# Patient Record
Sex: FEMALE | Race: WHITE | Employment: OTHER | URBAN - NONMETROPOLITAN AREA
[De-identification: names, ages, dates, MRNs, and addresses within clinical notes are randomized per-mention and may not be internally consistent; named-entity substitution may affect disease eponyms.]

---

## 2022-12-23 ENCOUNTER — APPOINTMENT (OUTPATIENT)
Dept: CT IMAGING | Age: 72
End: 2022-12-23
Payer: MEDICARE

## 2022-12-23 ENCOUNTER — HOSPITAL ENCOUNTER (EMERGENCY)
Age: 72
Discharge: HOME | End: 2022-12-23
Attending: STUDENT IN AN ORGANIZED HEALTH CARE EDUCATION/TRAINING PROGRAM
Payer: MEDICARE

## 2022-12-23 VITALS
HEART RATE: 80 BPM | OXYGEN SATURATION: 99 % | RESPIRATION RATE: 18 BRPM | BODY MASS INDEX: 28.47 KG/M2 | WEIGHT: 145 LBS | TEMPERATURE: 98.3 F | HEIGHT: 60 IN | SYSTOLIC BLOOD PRESSURE: 122 MMHG | DIASTOLIC BLOOD PRESSURE: 70 MMHG

## 2022-12-23 DIAGNOSIS — M54.31 BILATERAL SCIATICA: Primary | ICD-10-CM

## 2022-12-23 DIAGNOSIS — M54.32 BILATERAL SCIATICA: Primary | ICD-10-CM

## 2022-12-23 LAB
A/G RATIO: 1.3 (ref 1.1–2.2)
ALBUMIN SERPL-MCNC: 4 G/DL (ref 3.4–5)
ALP BLD-CCNC: 87 U/L (ref 40–129)
ALT SERPL-CCNC: 7 U/L (ref 10–40)
ANION GAP SERPL CALCULATED.3IONS-SCNC: 14 MMOL/L (ref 3–16)
AST SERPL-CCNC: 18 U/L (ref 15–37)
BACTERIA: ABNORMAL /HPF
BASOPHILS ABSOLUTE: 0.1 K/UL (ref 0–0.2)
BASOPHILS RELATIVE PERCENT: 0.7 %
BILIRUB SERPL-MCNC: 0.4 MG/DL (ref 0–1)
BILIRUBIN URINE: NEGATIVE
BLOOD, URINE: NEGATIVE
BUN BLDV-MCNC: 28 MG/DL (ref 7–20)
CALCIUM SERPL-MCNC: 9.3 MG/DL (ref 8.3–10.6)
CHLORIDE BLD-SCNC: 103 MMOL/L (ref 99–110)
CLARITY: ABNORMAL
CO2: 21 MMOL/L (ref 21–32)
COLOR: YELLOW
CREAT SERPL-MCNC: 0.8 MG/DL (ref 0.6–1.2)
EOSINOPHILS ABSOLUTE: 0.1 K/UL (ref 0–0.6)
EOSINOPHILS RELATIVE PERCENT: 0.3 %
EPITHELIAL CELLS, UA: ABNORMAL /HPF (ref 0–5)
GFR SERPL CREATININE-BSD FRML MDRD: >60 ML/MIN/{1.73_M2}
GLUCOSE BLD-MCNC: 151 MG/DL (ref 70–99)
GLUCOSE URINE: NEGATIVE MG/DL
HCT VFR BLD CALC: 40.6 % (ref 36–48)
HEMOGLOBIN: 13.8 G/DL (ref 12–16)
KETONES, URINE: NEGATIVE MG/DL
LEUKOCYTE ESTERASE, URINE: ABNORMAL
LYMPHOCYTES ABSOLUTE: 1.2 K/UL (ref 1–5.1)
LYMPHOCYTES RELATIVE PERCENT: 5.9 %
MCH RBC QN AUTO: 31.3 PG (ref 26–34)
MCHC RBC AUTO-ENTMCNC: 33.9 G/DL (ref 31–36)
MCV RBC AUTO: 92.2 FL (ref 80–100)
MICROSCOPIC EXAMINATION: YES
MONOCYTES ABSOLUTE: 1.1 K/UL (ref 0–1.3)
MONOCYTES RELATIVE PERCENT: 5.3 %
MUCUS: ABNORMAL /LPF
NEUTROPHILS ABSOLUTE: 17.7 K/UL (ref 1.7–7.7)
NEUTROPHILS RELATIVE PERCENT: 87.8 %
NITRITE, URINE: NEGATIVE
PDW BLD-RTO: 12.7 % (ref 12.4–15.4)
PH UA: 5.5 (ref 5–8)
PLATELET # BLD: 217 K/UL (ref 135–450)
PMV BLD AUTO: 7.1 FL (ref 5–10.5)
POTASSIUM REFLEX MAGNESIUM: 4.5 MMOL/L (ref 3.5–5.1)
PROTEIN UA: NEGATIVE MG/DL
RBC # BLD: 4.41 M/UL (ref 4–5.2)
RBC UA: ABNORMAL /HPF (ref 0–4)
SODIUM BLD-SCNC: 138 MMOL/L (ref 136–145)
SPECIFIC GRAVITY UA: 1.01 (ref 1–1.03)
TOTAL PROTEIN: 7 G/DL (ref 6.4–8.2)
URINE REFLEX TO CULTURE: ABNORMAL
URINE TYPE: ABNORMAL
UROBILINOGEN, URINE: 0.2 E.U./DL
WBC # BLD: 20.2 K/UL (ref 4–11)
WBC UA: ABNORMAL /HPF (ref 0–5)

## 2022-12-23 PROCEDURE — 99285 EMERGENCY DEPT VISIT HI MDM: CPT

## 2022-12-23 PROCEDURE — 74177 CT ABD & PELVIS W/CONTRAST: CPT

## 2022-12-23 PROCEDURE — 81001 URINALYSIS AUTO W/SCOPE: CPT

## 2022-12-23 PROCEDURE — 80053 COMPREHEN METABOLIC PANEL: CPT

## 2022-12-23 PROCEDURE — 96374 THER/PROPH/DIAG INJ IV PUSH: CPT

## 2022-12-23 PROCEDURE — 72131 CT LUMBAR SPINE W/O DYE: CPT

## 2022-12-23 PROCEDURE — 6360000004 HC RX CONTRAST MEDICATION: Performed by: STUDENT IN AN ORGANIZED HEALTH CARE EDUCATION/TRAINING PROGRAM

## 2022-12-23 PROCEDURE — 36415 COLL VENOUS BLD VENIPUNCTURE: CPT

## 2022-12-23 PROCEDURE — 6360000002 HC RX W HCPCS: Performed by: STUDENT IN AN ORGANIZED HEALTH CARE EDUCATION/TRAINING PROGRAM

## 2022-12-23 PROCEDURE — 85025 COMPLETE CBC W/AUTO DIFF WBC: CPT

## 2022-12-23 PROCEDURE — 96375 TX/PRO/DX INJ NEW DRUG ADDON: CPT

## 2022-12-23 RX ORDER — IBUPROFEN 200 MG
200 TABLET ORAL EVERY 6 HOURS PRN
COMMUNITY

## 2022-12-23 RX ORDER — TIZANIDINE 2 MG/1
2 TABLET ORAL EVERY 8 HOURS PRN
Qty: 30 TABLET | Refills: 0 | Status: SHIPPED | OUTPATIENT
Start: 2022-12-23

## 2022-12-23 RX ORDER — HYDROCODONE BITARTRATE AND ACETAMINOPHEN 5; 325 MG/1; MG/1
1 TABLET ORAL EVERY 6 HOURS PRN
Qty: 12 TABLET | Refills: 0 | Status: SHIPPED | OUTPATIENT
Start: 2022-12-23 | End: 2022-12-26

## 2022-12-23 RX ORDER — KETOROLAC TROMETHAMINE 30 MG/ML
15 INJECTION, SOLUTION INTRAMUSCULAR; INTRAVENOUS ONCE
Status: COMPLETED | OUTPATIENT
Start: 2022-12-23 | End: 2022-12-23

## 2022-12-23 RX ORDER — MORPHINE SULFATE 4 MG/ML
4 INJECTION, SOLUTION INTRAMUSCULAR; INTRAVENOUS ONCE
Status: COMPLETED | OUTPATIENT
Start: 2022-12-23 | End: 2022-12-23

## 2022-12-23 RX ORDER — CELECOXIB 100 MG/1
100 CAPSULE ORAL 2 TIMES DAILY
COMMUNITY

## 2022-12-23 RX ORDER — ORPHENADRINE CITRATE 30 MG/ML
60 INJECTION INTRAMUSCULAR; INTRAVENOUS ONCE
Status: COMPLETED | OUTPATIENT
Start: 2022-12-23 | End: 2022-12-23

## 2022-12-23 RX ORDER — FENTANYL CITRATE 50 UG/ML
50 INJECTION, SOLUTION INTRAMUSCULAR; INTRAVENOUS ONCE
Status: COMPLETED | OUTPATIENT
Start: 2022-12-23 | End: 2022-12-23

## 2022-12-23 RX ORDER — METHYLPREDNISOLONE 4 MG/1
TABLET ORAL
Qty: 1 KIT | Refills: 0 | Status: SHIPPED | OUTPATIENT
Start: 2022-12-23

## 2022-12-23 RX ORDER — DEXAMETHASONE SODIUM PHOSPHATE 10 MG/ML
10 INJECTION, SOLUTION INTRAMUSCULAR; INTRAVENOUS ONCE
Status: COMPLETED | OUTPATIENT
Start: 2022-12-23 | End: 2022-12-23

## 2022-12-23 RX ADMIN — FENTANYL CITRATE 50 MCG: 50 INJECTION INTRAMUSCULAR; INTRAVENOUS at 13:00

## 2022-12-23 RX ADMIN — DEXAMETHASONE SODIUM PHOSPHATE 10 MG: 10 INJECTION INTRAMUSCULAR; INTRAVENOUS at 13:00

## 2022-12-23 RX ADMIN — ORPHENADRINE CITRATE 60 MG: 30 INJECTION INTRAMUSCULAR; INTRAVENOUS at 13:01

## 2022-12-23 RX ADMIN — IOPAMIDOL 75 ML: 755 INJECTION, SOLUTION INTRAVENOUS at 14:37

## 2022-12-23 RX ADMIN — KETOROLAC TROMETHAMINE 15 MG: 30 INJECTION, SOLUTION INTRAMUSCULAR; INTRAVENOUS at 15:23

## 2022-12-23 RX ADMIN — MORPHINE SULFATE 4 MG: 4 INJECTION, SOLUTION INTRAMUSCULAR; INTRAVENOUS at 15:23

## 2022-12-23 ASSESSMENT — PAIN DESCRIPTION - LOCATION: LOCATION: BACK

## 2022-12-23 ASSESSMENT — PAIN SCALES - GENERAL
PAINLEVEL_OUTOF10: 8
PAINLEVEL_OUTOF10: 8

## 2022-12-23 ASSESSMENT — PAIN - FUNCTIONAL ASSESSMENT
PAIN_FUNCTIONAL_ASSESSMENT: NONE - DENIES PAIN
PAIN_FUNCTIONAL_ASSESSMENT: 0-10
PAIN_FUNCTIONAL_ASSESSMENT: NONE - DENIES PAIN

## 2022-12-23 NOTE — ED PROVIDER NOTES
Primary Care Physician: EMERALD Toribio CNP   Attending Physician: No att. providers found     History   Chief Complaint   Patient presents with    Back Pain     C/o sudden onset of mid L back pain after walking from the bathroom @ 0200 this AM        HPI   Marci Mckinnon  is a 71 y.o. female tension, hyperlipidemia, bladder prolapse who presents accompanied by  complaining of sudden onset lower back pain that started this morning after working from the bedroom at 2 AM.  She stated that the pain persisted. She stated the pain radiates down to her leg. She states she has never had pain like this in the past.  No bladder or stool incontinence. No headaches fevers chills nausea vomiting. Past Medical History:   Diagnosis Date    Arthritis     Bladder prolapse, female, acquired     GERD (gastroesophageal reflux disease)     Hyperlipidemia     Hypertension         Past Surgical History:   Procedure Laterality Date    ELBOW SURGERY Right     states she had removal of a \"bone chip\"    SKIN BIOPSY      TONSILLECTOMY          No family history on file. Social History     Socioeconomic History    Marital status:    Tobacco Use    Smoking status: Never     Passive exposure: Never    Smokeless tobacco: Never   Substance and Sexual Activity    Alcohol use: Not Currently    Drug use: Never    Sexual activity: Not Currently        Review of Systems   10 total systems reviewed and found to be negative unless otherwise noted in HPI     Physical Exam   /70   Pulse 80   Temp 98.3 °F (36.8 °C) (Oral)   Resp 18   Ht 5' (1.524 m)   Wt 145 lb (65.8 kg)   SpO2 99%   BMI 28.32 kg/m²      CONSTITUTIONAL: Well appearing, in acute distress   HEAD: atraumatic, normocephalic   EYES: PERRL, No injection, discharge or scleral icterus. ENT: Moist mucous membranes. NECK: Normal ROM, NO LAD   CARDIOVASCULAR: Regular rate and rhythm. No murmurs or gallop. PULMONARY/CHEST: Airway patent. No retractions. Breath sounds clear with good air entry bilaterally. ABDOMEN: Soft, Non-distended and non-tender, without guarding or rebound. SKIN: Acyanotic, warm, dry   MUSCULOSKELETAL: No swelling, or deformity. Palpation right gluteal gluteal  NEUROLOGICAL: Awake and oriented x 3. Pulses intact. Grossly nonfocal, deep tendon  flexes intact as well as motor and sensory  Nursing note and vitals reviewed. ED Course & Medical Decision Making   Medications   dexamethasone (PF) (DECADRON) injection 10 mg (10 mg IntraVENous Given 12/23/22 1300)   fentaNYL (SUBLIMAZE) injection 50 mcg (50 mcg IntraVENous Given 12/23/22 1300)   orphenadrine (NORFLEX) injection 60 mg (60 mg IntraVENous Given 12/23/22 1301)   iopamidol (ISOVUE-370) 76 % injection 75 mL (75 mLs IntraVENous Given 12/23/22 1437)   ketorolac (TORADOL) injection 15 mg (15 mg IntraVENous Given 12/23/22 1523)   morphine sulfate (PF) injection 4 mg (4 mg IntraVENous Given 12/23/22 1523)      Labs Reviewed   CBC WITH AUTO DIFFERENTIAL - Abnormal; Notable for the following components:       Result Value    WBC 20.2 (*)     Neutrophils Absolute 17.7 (*)     All other components within normal limits   COMPREHENSIVE METABOLIC PANEL W/ REFLEX TO MG FOR LOW K - Abnormal; Notable for the following components:    Glucose 151 (*)     BUN 28 (*)     ALT 7 (*)     All other components within normal limits   URINALYSIS WITH REFLEX TO CULTURE - Abnormal; Notable for the following components:    Clarity, UA SL CLOUDY (*)     Leukocyte Esterase, Urine TRACE (*)     All other components within normal limits   MICROSCOPIC URINALYSIS - Abnormal; Notable for the following components:    Mucus, UA Rare (*)     Bacteria, UA Rare (*)     All other components within normal limits      CT ABDOMEN PELVIS W IV CONTRAST Additional Contrast? None   Final Result   1. No signs of acute infection within the abdomen or pelvis.       2.  Moderate stool in the right and transverse colon with significant fecalization of the distal ileum. No soft tissue mass or high-grade   obstruction identified. 3.  Mildly distended gallbladder likely due to fasting state. 4.  Findings compatible with cystocele or severe pelvic floor relaxation,   bladder base extends through the perineum into the lower pelvis, not entirely   included on the exam.         CT LUMBAR SPINE WO CONTRAST   Final Result   Degenerative changes described above. Otherwise, no acute CT abnormality   lumbar spine. Flexion-extension views suggested if clinical concern of   lumbar instability. Air-fluid leveling visualized distal esophagus consistent with   gastroesophageal reflux or impaired esophageal emptying. Small hiatal hernia   also noted. Foci of subsegmental atelectasis or early/mild pneumonia/pneumonitis in   visualized lung bases. Isolated foci medullary/nonobstructing nephrocalcinosis bilaterally 2 mm in   greatest dimension. No hydronephrosis or perinephric fluid. RECOMMENDATIONS:   Unavailable            CT LUMBAR SPINE WO CONTRAST    Result Date: 12/23/2022  EXAMINATION: CT OF THE LUMBAR SPINE WITHOUT CONTRAST  12/23/2022 TECHNIQUE: CT of the lumbar spine was performed without the administration of intravenous contrast. Multiplanar reformatted images are provided for review. Adjustment of mA and/or kV according to patient size was utilized. Automated exposure control, iterative reconstruction, and/or weight based adjustment of the mA/kV was utilized to reduce the radiation dose to as low as reasonably achievable.  COMPARISON: None HISTORY: ORDERING SYSTEM PROVIDED HISTORY: low back pain TECHNOLOGIST PROVIDED HISTORY: Reason for exam:->low back pain Decision Support Exception - unselect if not a suspected or confirmed emergency medical condition->Emergency Medical Condition (MA) Reason for Exam: Back pain FINDINGS: BONES/ALIGNMENT: 4.5 mm anterior subluxation of L4 on L5 with advanced degenerative facet hypertrophy, 4.5-5 mm retrolisthesis T12 on L1. Approximately 15 degrees lumbar curvature concave to the right also noted. Findings appear most consistent with degenerative ligamentous laxity. Otherwise, vertebral body heights, pedicles, facet joints are intact. DEGENERATIVE CHANGES: Advanced intervertebral disc space narrowing, 4.5-5 mm retrolisthesis T12-L1 producing bilateral foraminal narrowing and exiting nerve effacement. Disc bulging, foraminal stenosis and exiting nerve effacement on the left at L4-L5. No significant central spinal canal stenosis. Advanced degenerative facet hypertrophy L5-S1, L4-L5. SOFT TISSUES/RETROPERITONEUM: Incomplete visualization air-fluid leveling in the distal esophagus with small hiatal hernia. Foci of parenchymal scarring, subsegmental atelectasis or mild infiltrative changes visualized lung bases. Correlation with clinical evidence gastroesophageal reflux, underlying chronic lung disease. Punctate 1-2 mm focus of medullary/nonobstructing nephrocalcinosis superior pole left kidney. No hydronephrosis, perinephric fluid or collecting system stone visualized urinary tract. Degenerative changes described above. Otherwise, no acute CT abnormality lumbar spine. Flexion-extension views suggested if clinical concern of lumbar instability. Air-fluid leveling visualized distal esophagus consistent with gastroesophageal reflux or impaired esophageal emptying. Small hiatal hernia also noted. Foci of subsegmental atelectasis or early/mild pneumonia/pneumonitis in visualized lung bases. Isolated foci medullary/nonobstructing nephrocalcinosis bilaterally 2 mm in greatest dimension. No hydronephrosis or perinephric fluid.  RECOMMENDATIONS: Unavailable     CT ABDOMEN PELVIS W IV CONTRAST Additional Contrast? None    Result Date: 12/23/2022  EXAMINATION: CT OF THE ABDOMEN AND PELVIS WITH CONTRAST 12/23/2022 2:08 pm TECHNIQUE: CT of the abdomen and pelvis was performed with the administration of intravenous contrast. Multiplanar reformatted images are provided for review. Automated exposure control, iterative reconstruction, and/or weight based adjustment of the mA/kV was utilized to reduce the radiation dose to as low as reasonably achievable. COMPARISON: None. HISTORY: ORDERING SYSTEM PROVIDED HISTORY: Infection of unknown cause TECHNOLOGIST PROVIDED HISTORY: Additional Contrast?->None Reason for exam:->Infection of unknown cause Decision Support Exception - unselect if not a suspected or confirmed emergency medical condition->Emergency Medical Condition (MA) FINDINGS: Lower Chest: Bandlike areas of atelectasis are noted in the lingular segment and right middle lobe. No pleural fluid or alveolar consolidation detected. Heart appears normal in size. Fluid is noted within the distal esophagus. Organs: Liver and spleen are normal in size. There is slight intrahepatic biliary ductal prominence. Gallbladder is mildly distended possibly due to fasting state. Pancreas shows significant atrophy without focal mass. The adrenal glands are normal.  Kidneys demonstrate cortical phase enhancement with a simple cyst in the lower pole the left kidney. There is right nephrolithiasis without renal obstruction. GI/Bowel: Moderate stool is noted throughout the colon. There is no evidence of colonic obstruction. There is fecalization of a long segment of the distal small bowel. No evidence of obstruction or mass at the terminal ileum or ileocecal valve. Proximal small bowel is normal in diameter. No evidence of mesenteric mass or adenopathy. No inflammation of the mesentery. Pelvis: There are findings compatible with a cyst is seal, the urinary bladder base extends into the lower perineum beyond the ischial tuberosities and is not included on the study in its most inferior extent. Uterus appears surgically absent. Please correlate with surgical history.  Peritoneum/Retroperitoneum: No pathologic retroperitoneal adenopathy or mass. The adrenal glands appear normal.  Tiny fat containing umbilical hernia. No free air. No evidence of ascites. Bones/Soft Tissues: Left convexity thoracolumbar scoliosis is present with degenerative disc disease. Mild osteopenia. Subcutaneous tissues are within normal limits. 1.  No signs of acute infection within the abdomen or pelvis. 2.  Moderate stool in the right and transverse colon with significant fecalization of the distal ileum. No soft tissue mass or high-grade obstruction identified. 3.  Mildly distended gallbladder likely due to fasting state. 4.  Findings compatible with cystocele or severe pelvic floor relaxation, bladder base extends through the perineum into the lower pelvis, not entirely included on the exam.       PROCEDURES:   Procedures    ASSESSMENT AND PLAN:  [id number], Rasheed Muñoz is a 71 y.o. female presents with lower back pain. On exam patient appears to be in pain but nontoxic. Tender to palpation right gluteal area. Straight leg test was positive. I was not concerned about cauda equina given no bladder staying continent. Patient was able to void in the emergency room. I did obtain a CT of the lumbar spine which was unremarkable for any acute findings. Patient was given steroids muscle relaxer and anti-inflammatory and was still complaining of pain. I decided to extend my work-up with a CT scan given the fact that she had elevated white count of 20,000 of unknown cause. She received another pain dose and felt much better. Given no signs of systemic infection and no fever despite normal white count which I thought was isolated and nonsignificant. Patient was discharged home with pain management recommendation to follow-up with primary care doctor. ClINICAL IMPRESSION:  1.  Bilateral sciatica          PATIENT REFERRED TO:  Yudy Moctezuma, APRN - CNP  [facility]  [address]  [place] [id number]  [phone]    Schedule an appointment as soon as possible for a visit in 2 days      DISCHARGE MEDICATIONS:  Discharge Medication List as of 12/23/2022  3:52 PM        DISCONTINUED MEDICATIONS:  Discharge Medication List as of 12/23/2022  3:52 PM        DISPOSITION Decision To Discharge 12/23/2022 03:51:10 PM    ___________________________________________________________________________________  _________________________________________________________________________________________  This record is transcribed utilizing voice recognition technology. There are inherent limitations in this technology. In addition, there may be limitations in editing of this report. If there are any discrepancies, please contact me directly.         Walt Malcolm MD  12/23/22 [id number]